# Patient Record
Sex: MALE | Race: WHITE | NOT HISPANIC OR LATINO | Employment: OTHER | URBAN - METROPOLITAN AREA
[De-identification: names, ages, dates, MRNs, and addresses within clinical notes are randomized per-mention and may not be internally consistent; named-entity substitution may affect disease eponyms.]

---

## 2020-08-31 ENCOUNTER — OFFICE VISIT (OUTPATIENT)
Dept: FAMILY MEDICINE | Facility: CLINIC | Age: 55
End: 2020-08-31
Payer: COMMERCIAL

## 2020-08-31 VITALS
SYSTOLIC BLOOD PRESSURE: 130 MMHG | RESPIRATION RATE: 18 BRPM | HEIGHT: 67 IN | DIASTOLIC BLOOD PRESSURE: 76 MMHG | HEART RATE: 77 BPM | BODY MASS INDEX: 31.71 KG/M2 | TEMPERATURE: 97.9 F | OXYGEN SATURATION: 99 % | WEIGHT: 202 LBS

## 2020-08-31 DIAGNOSIS — Z12.5 SCREENING FOR PROSTATE CANCER: ICD-10-CM

## 2020-08-31 DIAGNOSIS — Z00.00 ROUTINE GENERAL MEDICAL EXAMINATION AT A HEALTH CARE FACILITY: Primary | ICD-10-CM

## 2020-08-31 LAB
CHOLEST SERPL-MCNC: 197 MG/DL
GLUCOSE SERPL-MCNC: 106 MG/DL (ref 70–99)
HDLC SERPL-MCNC: 34 MG/DL
LDLC SERPL CALC-MCNC: 93 MG/DL
NONHDLC SERPL-MCNC: 163 MG/DL
TRIGL SERPL-MCNC: 348 MG/DL

## 2020-08-31 PROCEDURE — 99386 PREV VISIT NEW AGE 40-64: CPT | Performed by: FAMILY MEDICINE

## 2020-08-31 PROCEDURE — 82947 ASSAY GLUCOSE BLOOD QUANT: CPT | Performed by: FAMILY MEDICINE

## 2020-08-31 PROCEDURE — 80061 LIPID PANEL: CPT | Performed by: FAMILY MEDICINE

## 2020-08-31 PROCEDURE — 36415 COLL VENOUS BLD VENIPUNCTURE: CPT | Performed by: FAMILY MEDICINE

## 2020-08-31 ASSESSMENT — ENCOUNTER SYMPTOMS
SORE THROAT: 0
DYSURIA: 0
FREQUENCY: 0
DIZZINESS: 0
NERVOUS/ANXIOUS: 0
HEMATOCHEZIA: 0
ARTHRALGIAS: 1
EYE PAIN: 0
WEAKNESS: 0
FEVER: 0
HEMATURIA: 0
JOINT SWELLING: 0
CONSTIPATION: 0
NAUSEA: 0
MYALGIAS: 0
PARESTHESIAS: 0
COUGH: 0
HEARTBURN: 0
PALPITATIONS: 0
ABDOMINAL PAIN: 0
SHORTNESS OF BREATH: 0
CHILLS: 0
HEADACHES: 0
DIARRHEA: 0

## 2020-08-31 ASSESSMENT — MIFFLIN-ST. JEOR: SCORE: 1695.93

## 2020-08-31 NOTE — LETTER
"September 4, 2020      Phillip Schaefer  [address] [address]   [place] [place] [address]        Dear ,    We are writing to inform you of your test results.      Labs show slight increase in blood sugar.      You can lower your chances of progressing to diabetes by keeping your weight down, exercising regularly, and cutting down on empty calories and unhealthy foods.    Please eat more fruits and veggies, lean meats, and whole grains.  Chips, pop, sweets, and high sugar/carbohydrate foods should be rare treats, if at all. (soda pop and juices are especially bad.    Please try to switch to diet pop or diet juices if you drink these regularly)       Cholesterol OK, triglycerides up a bit, that can be improved with  Some diet changes as well. Cholesterol numbers are generally improved by cutting back on greasy/creamy/fatty/fried foods, eating more veggies/salads/fruits, and getting regular exercise. Switching to olive oil as your main cooking and flavoring oil is also a good idea.  (it's considered a \"good\" fat).         We can recheck things next year.  I hope you are doing well.    Resulted Orders   GLUCOSE   Result Value Ref Range    Glucose 106 (H) 70 - 99 mg/dL      Comment:      Non Fasting   Lipid panel reflex to direct LDL Non-fasting   Result Value Ref Range    Cholesterol 197 <200 mg/dL    Triglycerides 348 (H) <150 mg/dL      Comment:      Borderline high:  150-199 mg/dl  High:             200-499 mg/dl  Very high:       >499 mg/dl  Non Fasting      HDL Cholesterol 34 (L) >39 mg/dL    LDL Cholesterol Calculated 93 <100 mg/dL      Comment:      Desirable:       <100 mg/dl    Non HDL Cholesterol 163 (H) <130 mg/dL      Comment:      Above Desirable:  130-159 mg/dl  Borderline high:  160-189 mg/dl  High:             190-219 mg/dl  Very high:       >219 mg/dl         If you have any questions or concerns, please call the clinic at the number listed above.       Sincerely,        Yoseph Coello, " MD

## 2020-08-31 NOTE — PROGRESS NOTES
SUBJECTIVE:   CC: Phillip Schaefer is an 55 year old male who presents for preventative health visit.     Healthy Habits:     Getting at least 3 servings of Calcium per day:  Yes    Bi-annual eye exam:  NO    Dental care twice a year:  NO    Sleep apnea or symptoms of sleep apnea:  None    Diet:  Regular (no restrictions)    Frequency of exercise:  2-3 days/week    Duration of exercise:  15-30 minutes    Taking medications regularly:  Yes    Medication side effects:  None    PHQ-2 Total Score: 0    Additional concerns today:  No            Today's PHQ-2 Score:   PHQ-2 ( 1999 [employer]) 8/31/2020   Q1: Little interest or pleasure in doing things 0   Q2: Feeling down, depressed or hopeless 0   PHQ-2 Score 0   Q1: Little interest or pleasure in doing things Not at all   Q2: Feeling down, depressed or hopeless Not at all   PHQ-2 Score 0       Abuse: Current or Past(Physical, Sexual or Emotional)- No  Do you feel safe in your environment? Yes    Have you ever done Advance Care Planning? (For example, a Health Directive, POLST, or a discussion with a medical provider or your loved ones about your wishes): Yes, advance care planning is on file.    Social History     Tobacco Use     Smoking status: Not on file   Substance Use Topics     Alcohol use: Not on file         Alcohol Use 8/31/2020   Prescreen: >3 drinks/day or >7 drinks/week? No       Last PSA: No results found for: PSA    Reviewed orders with patient. Reviewed health maintenance and updated orders accordingly - Yes      Reviewed and updated as needed this visit by clinical staff         Reviewed and updated as needed this visit by Provider            Review of Systems   Constitutional: Negative for chills and fever.   HENT: Negative for congestion, ear pain, hearing loss and sore throat.    Eyes: Negative for pain and visual disturbance.   Respiratory: Negative for cough and shortness of breath.    Cardiovascular: Negative for chest pain, palpitations and  "peripheral edema.   Gastrointestinal: Negative for abdominal pain, constipation, diarrhea, heartburn, hematochezia and nausea.   Genitourinary: Negative for discharge, dysuria, frequency, genital sores, hematuria, impotence and urgency.   Musculoskeletal: Positive for arthralgias. Negative for joint swelling and myalgias.   Skin: Negative for rash.   Neurological: Negative for dizziness, weakness, headaches and paresthesias.   Psychiatric/Behavioral: Negative for mood changes. The patient is not nervous/anxious.          OBJECTIVE:   /76   Pulse 77   Temp 97.9  F (36.6  C)   Resp 18   Ht 1.695 m (5' 6.75\")   Wt 91.6 kg (202 lb)   SpO2 99%   BMI 31.88 kg/m      Physical Exam  Gen: alert and oriented, in no acute distress, affect within normal limits  Neck: supple with no masses or nodes  Throat: oropharynx clear, no exudate or tonsillar/palate asymmetry.    CV: RRR, no murmur  Lungs: clear bilaterally with good effort  Abd: nontender, no mass  Ext: no edema or lesions   Neuro: moving all extremities, gait normal, no focal deficts noted      ASSESSMENT/PLAN:   Well exam      Discussed risks and benefits of PSA screening with the patient, attempting to engage in shared decision making.  Discussed the high likelihood of further invasive evaluation if the PSA is elevated.  Discussed inability to reliably distinguish aggressive from indolent cancer on biopsy. Explained that PSA screeing and aggressive treatment for biopsy confirmed cancer may extend life in those patients harboring an aggressive form of prostate cancer, but will overtreat the overwhelming majority of men because of the high rate of indolent cancer.  We discussed that treatment of prostate cancer is not benign, and that significant side effects are a real possibility.  We discussed current USPSTF recommendations against any routine screening for prostate cancer.                 Given the above information, the patient elected not to procede " "with PSA screeing at this time.  He understands he can revisit the discussion at any time.       COUNSELING:   Reviewed preventive health counseling, as reflected in patient instructions       Regular exercise       Healthy diet/nutrition    Estimated body mass index is 31.88 kg/m  as calculated from the following:    Height as of this encounter: 1.695 m (5' 6.75\").    Weight as of this encounter: 91.6 kg (202 lb).     Weight management plan: diet/exercise    He has no history on file for tobacco.          Yoseph Coello MD  [facility]  "

## 2023-10-12 ENCOUNTER — OFFICE VISIT (OUTPATIENT)
Dept: FAMILY MEDICINE | Facility: CLINIC | Age: 58
End: 2023-10-12
Payer: COMMERCIAL

## 2023-10-12 ENCOUNTER — ANCILLARY PROCEDURE (OUTPATIENT)
Dept: GENERAL RADIOLOGY | Facility: CLINIC | Age: 58
End: 2023-10-12
Attending: PHYSICIAN ASSISTANT
Payer: COMMERCIAL

## 2023-10-12 ENCOUNTER — LAB (OUTPATIENT)
Dept: FAMILY MEDICINE | Facility: CLINIC | Age: 58
End: 2023-10-12

## 2023-10-12 VITALS
HEART RATE: 78 BPM | HEIGHT: 67 IN | BODY MASS INDEX: 32.11 KG/M2 | OXYGEN SATURATION: 98 % | RESPIRATION RATE: 18 BRPM | WEIGHT: 204.6 LBS | SYSTOLIC BLOOD PRESSURE: 124 MMHG | DIASTOLIC BLOOD PRESSURE: 84 MMHG | TEMPERATURE: 96.9 F

## 2023-10-12 DIAGNOSIS — M25.512 CHRONIC PAIN OF BOTH SHOULDERS: ICD-10-CM

## 2023-10-12 DIAGNOSIS — M25.511 CHRONIC PAIN OF BOTH SHOULDERS: ICD-10-CM

## 2023-10-12 DIAGNOSIS — Z12.11 SPECIAL SCREENING FOR MALIGNANT NEOPLASMS, COLON: ICD-10-CM

## 2023-10-12 DIAGNOSIS — R20.2 NUMBNESS AND TINGLING OF BOTH FEET: ICD-10-CM

## 2023-10-12 DIAGNOSIS — G89.29 CHRONIC PAIN OF BOTH SHOULDERS: ICD-10-CM

## 2023-10-12 DIAGNOSIS — Z13.1 SCREENING FOR DIABETES MELLITUS: ICD-10-CM

## 2023-10-12 DIAGNOSIS — Z11.59 NEED FOR HEPATITIS C SCREENING TEST: ICD-10-CM

## 2023-10-12 DIAGNOSIS — Z13.220 SCREENING FOR HYPERLIPIDEMIA: ICD-10-CM

## 2023-10-12 DIAGNOSIS — Z11.4 ENCOUNTER FOR SCREENING FOR HIV: ICD-10-CM

## 2023-10-12 DIAGNOSIS — R20.0 NUMBNESS AND TINGLING OF BOTH FEET: ICD-10-CM

## 2023-10-12 DIAGNOSIS — Z00.00 ROUTINE GENERAL MEDICAL EXAMINATION AT A HEALTH CARE FACILITY: ICD-10-CM

## 2023-10-12 DIAGNOSIS — M25.50 POLYARTHRALGIA: Primary | ICD-10-CM

## 2023-10-12 LAB
B BURGDOR IGG+IGM SER QL: 0.28
CHOLEST SERPL-MCNC: 199 MG/DL
CRP SERPL-MCNC: <3 MG/L
ERYTHROCYTE [SEDIMENTATION RATE] IN BLOOD BY WESTERGREN METHOD: 5 MM/HR (ref 0–20)
FASTING STATUS PATIENT QL REPORTED: YES
GLUCOSE SERPL-MCNC: 114 MG/DL (ref 70–99)
HCV AB SERPL QL IA: NONREACTIVE
HDLC SERPL-MCNC: 33 MG/DL
HIV 1+2 AB+HIV1 P24 AG SERPL QL IA: NONREACTIVE
LDLC SERPL CALC-MCNC: 147 MG/DL
NONHDLC SERPL-MCNC: 166 MG/DL
TRIGL SERPL-MCNC: 97 MG/DL

## 2023-10-12 PROCEDURE — 73030 X-RAY EXAM OF SHOULDER: CPT | Mod: TC | Performed by: STUDENT IN AN ORGANIZED HEALTH CARE EDUCATION/TRAINING PROGRAM

## 2023-10-12 PROCEDURE — 99396 PREV VISIT EST AGE 40-64: CPT | Performed by: PHYSICIAN ASSISTANT

## 2023-10-12 PROCEDURE — 85652 RBC SED RATE AUTOMATED: CPT | Performed by: PHYSICIAN ASSISTANT

## 2023-10-12 PROCEDURE — 87389 HIV-1 AG W/HIV-1&-2 AB AG IA: CPT | Performed by: PHYSICIAN ASSISTANT

## 2023-10-12 PROCEDURE — 86140 C-REACTIVE PROTEIN: CPT | Performed by: PHYSICIAN ASSISTANT

## 2023-10-12 PROCEDURE — 86803 HEPATITIS C AB TEST: CPT | Performed by: PHYSICIAN ASSISTANT

## 2023-10-12 PROCEDURE — 99214 OFFICE O/P EST MOD 30 MIN: CPT | Mod: 25 | Performed by: PHYSICIAN ASSISTANT

## 2023-10-12 PROCEDURE — 80061 LIPID PANEL: CPT | Performed by: PHYSICIAN ASSISTANT

## 2023-10-12 PROCEDURE — 36415 COLL VENOUS BLD VENIPUNCTURE: CPT | Performed by: PHYSICIAN ASSISTANT

## 2023-10-12 PROCEDURE — 86618 LYME DISEASE ANTIBODY: CPT | Performed by: PHYSICIAN ASSISTANT

## 2023-10-12 PROCEDURE — 82947 ASSAY GLUCOSE BLOOD QUANT: CPT | Performed by: PHYSICIAN ASSISTANT

## 2023-10-12 PROCEDURE — 86038 ANTINUCLEAR ANTIBODIES: CPT | Performed by: PHYSICIAN ASSISTANT

## 2023-10-12 PROCEDURE — 86200 CCP ANTIBODY: CPT | Performed by: PHYSICIAN ASSISTANT

## 2023-10-12 ASSESSMENT — ENCOUNTER SYMPTOMS
SORE THROAT: 0
DIZZINESS: 0
DIARRHEA: 0
CHILLS: 0
DYSURIA: 0
ABDOMINAL PAIN: 0
PALPITATIONS: 0
ARTHRALGIAS: 1
PARESTHESIAS: 0
CONSTIPATION: 0
WEAKNESS: 1
HEMATURIA: 0
JOINT SWELLING: 1
NAUSEA: 0
HEADACHES: 0
EYE PAIN: 0
NERVOUS/ANXIOUS: 0
MYALGIAS: 1
FEVER: 0
FREQUENCY: 0
SHORTNESS OF BREATH: 0
COUGH: 0
HEMATOCHEZIA: 0

## 2023-10-12 ASSESSMENT — PAIN SCALES - GENERAL: PAINLEVEL: MODERATE PAIN (4)

## 2023-10-12 NOTE — PROGRESS NOTES
"SUBJECTIVE:   CC: Phillip is an 58 year old who presents for preventative health visit.       10/12/2023     8:09 AM   Additional Questions   Roomed by Rowena MOSCOSO CMA     Healthy Habits:     Getting at least 3 servings of Calcium per day:  Yes    Bi-annual eye exam:  Yes    Dental care twice a year:  Yes    Sleep apnea or symptoms of sleep apnea:  None    Diet:  Other    Duration of exercise:  N/A    Taking medications regularly:  Yes    Medication side effects:  None    Today's PHQ-2 Score:       10/12/2023     8:02 AM   PHQ-2 ( 1999 [employer])   Q1: Little interest or pleasure in doing things 0   Q2: Feeling down, depressed or hopeless 0   PHQ-2 Score 0   Q1: Little interest or pleasure in doing things Not at all   Q2: Feeling down, depressed or hopeless Not at all   PHQ-2 Score 0       Social History     Tobacco Use    Smoking status: Former     Types: Cigarettes     Quit date: 8/31/2013     Years since quitting: 10.1    Smokeless tobacco: Never   Substance Use Topics    Alcohol use: Yes     Comment: rare, a drink every 3 months             10/12/2023     8:01 AM   Alcohol Use   Prescreen: >3 drinks/day or >7 drinks/week? No          No data to display                Last PSA: No results found for: \"PSA\"    Reviewed orders with patient. Reviewed health maintenance and updated orders accordingly - Yes  Lab work is in process  Labs reviewed in EPIC  BP Readings from Last 3 Encounters:   10/12/23 124/84   08/31/20 130/76    Wt Readings from Last 3 Encounters:   10/12/23 92.8 kg (204 lb 9.6 oz)   08/31/20 91.6 kg (202 lb)         Reviewed and updated as needed this visit by clinical staff   Tobacco  Allergies  Meds  Problems  Med Hx  Surg Hx  Fam Hx        Reviewed and updated as needed this visit by Provider   Tobacco  Allergies  Meds  Problems  Med Hx  Surg Hx  Fam Hx         History reviewed. No pertinent past medical history.   Past Surgical History:   Procedure Laterality Date    CARPAL TUNNEL RELEASE " "RT/LT      HERNIA REPAIR      JOINT REPLACEMENT      hip    LACERATION REPAIR      forearm       Review of Systems   Constitutional:  Negative for chills and fever.   HENT:  Negative for congestion, ear pain, hearing loss and sore throat.    Eyes:  Negative for pain and visual disturbance.   Respiratory:  Negative for cough and shortness of breath.    Cardiovascular:  Negative for chest pain, palpitations and peripheral edema.   Gastrointestinal:  Negative for abdominal pain, constipation, diarrhea, hematochezia and nausea.   Genitourinary:  Negative for dysuria, frequency, genital sores, hematuria, impotence, penile discharge and urgency.   Musculoskeletal:  Positive for arthralgias, joint swelling and myalgias.   Skin:  Negative for rash.   Neurological:  Positive for weakness. Negative for dizziness, headaches and paresthesias.   Psychiatric/Behavioral:  Negative for mood changes. The patient is not nervous/anxious.      OBJECTIVE:   /84 (BP Location: Right arm, Patient Position: Sitting, Cuff Size: Adult Large)   Pulse 78   Temp 96.9  F (36.1  C) (Tympanic)   Resp 18   Ht 1.695 m (5' 6.75\")   Wt 92.8 kg (204 lb 9.6 oz)   SpO2 98%   BMI 32.29 kg/m      Physical Exam  GENERAL: healthy, alert and no distress  NECK: no adenopathy, no asymmetry, masses, or scars and thyroid normal to palpation  RESP: lungs clear to auscultation - no rales, rhonchi or wheezes  CV: regular rate and rhythm, normal S1 S2, no S3 or S4, no murmur, click or rub, no peripheral edema and peripheral pulses strong  ABDOMEN: soft, nontender, no hepatosplenomegaly, no masses and bowel sounds normal  MS: normal muscle tone, decreased passive range of motion bilaterally, and peripheral pulses normal.    Right shoulder: strength 5/5, negative Jarreau test, unable to perform Keo liftoff due to patient limited ROM, pain against resistance with should flexion, negative apprehension test.    Left shoulder: strength 5-/5, positive " Mission test, unable to perform Austin liftoff due to patient limited ROM, pain against resistance with shoulder flexion, negative apprehension test.   SKIN: no suspicious lesions or rashes  EXTREMITIES (right and left feet): Monofilament utilized and sensation intact bilaterally, strength 5/5 bilaterally, pain to deep palpation at medial ball of feet. No lesions or masses evident with palpation.     Diagnostic Test Results:  Labs reviewed in Epic  No results found for this or any previous visit (from the past 24 hour(s)).      ASSESSMENT/PLAN:   Polyarthralgia  Present for past 4 months and is constant, feels weaker. Feels fatigued. Requests Lyme titer due to several tick bites over the summer. Polymyalgia rheumatica vs Lyme vs chronic rotator cuff sprain vs other. Labs ordered for further evaluation.   - Lyme Disease Total Abs Bld with Reflex to Confirm CLIA; Future  - ESR: Erythrocyte sedimentation rate; Future  - CRP, inflammation; Future  - Anti Nuclear Adrienne IgG by IFA with Reflex; Future  - Cyclic Citrullinated Peptide Antibody IgG; Future    Chronic pain of both shoulders  Began in Spring 2023 and snaps from time to time. Limited motion, constant pain and discomfort, limited ROM. Polymyalgia rheumatica vs Lyme vs OA or other msk pathology, work up pending. XR ordered to rule out acute process. Recommend PT to strengthen muscles surrounding rotator cuff muscles. If labs normal, consider referral to orthopedics for further evaluation. Follow-up after PT, about 3 months.   - ESR: Erythrocyte sedimentation rate; Future  - CRP, inflammation; Future  - Anti Nuclear Adrienne IgG by IFA with Reflex; Future  - Cyclic Citrullinated Peptide Antibody IgG; Future  - Physical Therapy Referral; Future  - XR Shoulder Bilateral G/E 2 Views; Future    Numbness and tingling of both feet  Present for 3 years and constant. Hurts when walks barefoot around house but no evidence of plantar fasciitis or plantar neuroma. Will check  glucose to determine if potential diabetic etiology of numbness but unclear at this time.    Routine general medical examination at a health care facility  58 yr old here for physical exam. Last physical exam done 1 year ago. Discussed preventative screenings which are updated below. Counseled on immunizations and healthy lifestyle. Follow-up in 1 year for repeat physical exam.     Screening for hyperlipidemia  Routine lipid panel due to BMI.   - Lipid panel reflex to direct LDL Fasting; Future    Screening for diabetes mellitus  Routine glucose screening due to BMI, numbness in bilateral feet.   - Glucose; Future    Special screening for malignant neoplasms, colon  Patient does not want to pursue colonoscopy at this time and opts for ColoGuard. Understands that if ColoGuard is abnormal that will need further screen with colonoscopy.   - COLOGUARD(EXACT SCIENCES); Future    Encounter for screening for HIV  Routine single HIV screen with low-risk adults.   - HIV Antigen Antibody Combo; Future    Need for hepatitis C screening test  Routine single Hepatitis C screen with low-risk adults.   - Hepatitis C antibody; Future    Patient has been advised of split billing requirements and indicates understanding: Yes    COUNSELING:   Reviewed preventive health counseling, as reflected in patient instructions       Regular exercise       Healthy diet/nutrition       Immunizations  Patient to get Shingrix vaccine at local pharmacy   Declined COVID/Flu/RSV vaccines        Consider Hep C screening for all patients one time for ages 18-79 years       HIV screeninx in teen years, 1x in adult years, and at intervals if high risk       Colorectal cancer screening    He reports that he quit smoking about 10 years ago. His smoking use included cigarettes. He has never used smokeless tobacco.    This patient was seen by KD Camilo-S3, in collaboration with ZAH[facility].     Physician Attestation   IDane  ABDIRASHID Moody, was present with the medical/KARRIE student who participated in the service and in the documentation of the note.  I have verified the history and personally performed the physical exam and medical decision making.  I agree with the assessment and plan of care as documented in the note.      Dane Moody PA-C   [facility]

## 2023-10-12 NOTE — PATIENT INSTRUCTIONS
Preventative labs and labs to determine the cause of you shoulder pain and weakness.     Colon cancer screening will be mailed to your house.     XR of your shoulders today.     Preventive Health Recommendations  Male Ages 50 - 64    Yearly exam:             See your health care provider every year in order to  o   Review health changes.   o   Discuss preventive care.    o   Review your medicines if your doctor has prescribed any.   Have a cholesterol test every 5 years, or more frequently if you are at risk for high cholesterol/heart disease.   Have a diabetes test (fasting glucose) every three years. If you are at risk for diabetes, you should have this test more often.   Have a colonoscopy at age 45, or have a yearly FIT test (stool test). These exams will check for colon cancer.    Talk with your health care provider about whether or not a prostate cancer screening test (PSA) is right for you.  You should be tested each year for STDs (sexually transmitted diseases), if you re at risk.     Shots: Get a flu shot each year. Get a tetanus shot every 10 years.     Nutrition:  Eat at least 5 servings of fruits and vegetables daily.   Eat whole-grain bread, whole-wheat pasta and brown rice instead of white grains and rice.   Get adequate Calcium and Vitamin D.     Lifestyle  Exercise for at least 150 minutes a week (30 minutes a day, 5 days a week). This will help you control your weight and prevent disease.   Limit alcohol to one drink per day.   No smoking.   Wear sunscreen to prevent skin cancer.   See your dentist every six months for an exam and cleaning.   See your eye doctor every 1 to 2 years.    Preventive Health Recommendations  Male Ages 50 - 64    Yearly exam:             See your health care provider every year in order to  o   Review health changes.   o   Discuss preventive care.    o   Review your medicines if your doctor has prescribed any.   Have a cholesterol test every 5 years, or more frequently if  you are at risk for high cholesterol/heart disease.   Have a diabetes test (fasting glucose) every three years. If you are at risk for diabetes, you should have this test more often.   Have a colonoscopy at age 45, or have a yearly FIT test (stool test). These exams will check for colon cancer.    Talk with your health care provider about whether or not a prostate cancer screening test (PSA) is right for you.  You should be tested each year for STDs (sexually transmitted diseases), if you re at risk.     Shots: Get a flu shot each year. Get a tetanus shot every 10 years.     Nutrition:  Eat at least 5 servings of fruits and vegetables daily.   Eat whole-grain bread, whole-wheat pasta and brown rice instead of white grains and rice.   Get adequate Calcium and Vitamin D.     Lifestyle  Exercise for at least 150 minutes a week (30 minutes a day, 5 days a week). This will help you control your weight and prevent disease.   Limit alcohol to one drink per day.   No smoking.   Wear sunscreen to prevent skin cancer.   See your dentist every six months for an exam and cleaning.   See your eye doctor every 1 to 2 years.

## 2023-10-13 LAB
ANA SER QL IF: NEGATIVE
CCP AB SER IA-ACNC: <0.4 U/ML

## 2023-11-02 ENCOUNTER — OFFICE VISIT (OUTPATIENT)
Dept: ORTHOPEDICS | Facility: CLINIC | Age: 58
End: 2023-11-02
Attending: PHYSICIAN ASSISTANT
Payer: COMMERCIAL

## 2023-11-02 VITALS
SYSTOLIC BLOOD PRESSURE: 138 MMHG | HEIGHT: 67 IN | WEIGHT: 207 LBS | BODY MASS INDEX: 32.49 KG/M2 | DIASTOLIC BLOOD PRESSURE: 84 MMHG

## 2023-11-02 DIAGNOSIS — M75.80 ROTATOR CUFF TENDINITIS, UNSPECIFIED LATERALITY: ICD-10-CM

## 2023-11-02 DIAGNOSIS — G89.29 CHRONIC PAIN OF BOTH SHOULDERS: ICD-10-CM

## 2023-11-02 DIAGNOSIS — M25.512 CHRONIC PAIN OF BOTH SHOULDERS: ICD-10-CM

## 2023-11-02 DIAGNOSIS — M25.619 LIMITED RANGE OF MOTION (ROM) OF SHOULDER: ICD-10-CM

## 2023-11-02 DIAGNOSIS — M75.32 CALCIFIC TENDINITIS OF BOTH SHOULDER REGIONS: Primary | ICD-10-CM

## 2023-11-02 DIAGNOSIS — M19.019 OSTEOARTHRITIS OF AC (ACROMIOCLAVICULAR) JOINT: ICD-10-CM

## 2023-11-02 DIAGNOSIS — M75.31 CALCIFIC TENDINITIS OF BOTH SHOULDER REGIONS: Primary | ICD-10-CM

## 2023-11-02 DIAGNOSIS — M25.511 CHRONIC PAIN OF BOTH SHOULDERS: ICD-10-CM

## 2023-11-02 PROCEDURE — 99244 OFF/OP CNSLTJ NEW/EST MOD 40: CPT | Mod: 25 | Performed by: FAMILY MEDICINE

## 2023-11-02 PROCEDURE — 20611 DRAIN/INJ JOINT/BURSA W/US: CPT | Mod: 50 | Performed by: FAMILY MEDICINE

## 2023-11-02 RX ORDER — ROPIVACAINE HYDROCHLORIDE 5 MG/ML
3 INJECTION, SOLUTION EPIDURAL; INFILTRATION; PERINEURAL
Status: SHIPPED | OUTPATIENT
Start: 2023-11-02

## 2023-11-02 RX ORDER — TRIAMCINOLONE ACETONIDE 40 MG/ML
40 INJECTION, SUSPENSION INTRA-ARTICULAR; INTRAMUSCULAR
Status: SHIPPED | OUTPATIENT
Start: 2023-11-02

## 2023-11-02 RX ADMIN — ROPIVACAINE HYDROCHLORIDE 3 ML: 5 INJECTION, SOLUTION EPIDURAL; INFILTRATION; PERINEURAL at 10:20

## 2023-11-02 RX ADMIN — TRIAMCINOLONE ACETONIDE 40 MG: 40 INJECTION, SUSPENSION INTRA-ARTICULAR; INTRAMUSCULAR at 10:20

## 2023-11-02 NOTE — PROGRESS NOTES
"Phillip Schaefer  :  1965  DOS: 2023  MRN: [id number]    Sports Medicine Clinic Visit    PCP: No Ref-Primary, Physician    Phillip Schaefer is a 58 year old Right hand dominant male who is seen in consultation at the request of  Dane Moody PA-C presenting with bilateral shoulder pain.    Injury: Gradual onset of pain over the past 9 months that initially started with shoveling and plowing snow in 2023.  Pain located over bilateral generalized anterior, posterior shoulder with radiating to periscapular region.  Denies any radiating symptoms at this time.  Additional Features:  Positive: weakness and joint stiffness.  Symptoms are better with rest, naproxen.  Symptoms are worse with: shoulder flexion/abduction, lifting, IR behind back, lying on either shoulder, driving.  Other evaluation and/or treatments so far consists of: Aleve and Rest, blood inflammatory labs.  Recent imaging completed: X-rays completed 10/12/23.  Prior History of related problems: none    Social History: currently employed as lawn mowing/snow plow service    Review of Systems  Musculoskeletal: as above  Remainder of review of systems is negative including constitutional, CV, pulmonary, GI, Skin and Neurologic except as noted in HPI or medical history.    No past medical history on file.  Past Surgical History:   Procedure Laterality Date    CARPAL TUNNEL RELEASE RT/LT      HERNIA REPAIR      JOINT REPLACEMENT      hip    LACERATION REPAIR      forearm     Family History   Problem Relation Age of Onset    Macular Degeneration Mother     Hypertension Mother     Pancreatic Cancer Father     Cancer Maternal Grandfather     Diabetes Paternal Grandmother     Cancer Paternal Grandfather     Prostate Cancer Brother        Objective  /84   Ht 1.695 m (5' 6.75\")   Wt 93.9 kg (207 lb)   BMI 32.66 kg/m      General: healthy, alert and in no distress    HEENT: no scleral icterus or conjunctival erythema   Skin: no " suspicious lesions or rash. No jaundice.   CV: regular rhythm by palpation, 2+ distal pulses, no pedal edema    Resp: normal respiratory effort without conversational dyspnea   Psych: normal mood and affect    Gait: nonantalgic, appropriate coordination and balance   Neuro: normal light touch sensory exam of the extremities. Motor strength as noted below     Bilateral Shoulder exam    ROM:        Baseline active and passive ROM with flexion, extension, abduction, internal and external rotation, mild/moderate limitation with IR due to stiffness/pain    Tender:        acromioclavicular joint L>R       subacromial space       posterior shoulder       GH joint mild L>R    Non Tender:       remainder of shoulder    Strength:        abduction 5-/5       internal rotation 5/5       external rotation 5-/5       adduction 5/5    Impingement testing:        positive (+) Neer       positive (+) Anders       positive (+) empty can       Mildly painful left crank       Mildly painful crossover, L>R    Stability testing:       neg (-) anterior glide       neg (-) sulcus sign    Skin:       no visible deformities       well perfused       capillary refill brisk    Sensation:        normal sensation over shoulder and upper extremity       Radiology  Results for orders placed or performed in visit on 10/12/23   XR Shoulder Bilateral G/E 2 Views    Narrative    XR SHOULDER BILATERAL G/E 2 VIEWS 10/12/2023 9:34 AM     HISTORY: Chronic pain of both shoulders; Chronic pain of both  shoulders; Chronic pain of both shoulders    COMPARISON: None.       Impression    IMPRESSION:    Right shoulder: No acute fracture or dislocation. Moderate  acromioclavicular joint osteoarthrosis.    Left shoulder: No acute fracture or dislocation. Moderate  acromioclavicular joint osteoarthrosis. Small amorphous calcification  projecting near the insertion of the rotator cuff tendons, which could  reflect calcific tendinopathy.    STEFAN LOPEZ MD          SYSTEM ID:  [id number]     Large Joint Injection/Arthocentesis: bilateral subacromial bursa    Date/Time: 11/2/2023 10:20 AM    Performed by: Jamal Garcia DO  Authorized by: Jamal Garcia DO    Indications:  Pain  Needle Size:  22 G  Guidance: ultrasound    Approach:  Anterolateral  Location:  Shoulder  Laterality:  Bilateral      Site:  Bilateral subacromial bursa  Medications (Right):  40 mg triamcinolone 40 MG/ML; 3 mL ROPivacaine 5 MG/ML  Medications (Left):  40 mg triamcinolone 40 MG/ML; 3 mL ROPivacaine 5 MG/ML  Outcome:  Tolerated well, no immediate complications  Procedure discussed: discussed risks, benefits, and alternatives    Consent Given by:  Patient  Timeout: timeout called immediately prior to procedure    Prep: patient was prepped and draped in usual sterile fashion     Ultrasound images of procedure were permanently stored.         Assessment:  1. Calcific tendinitis of both shoulder regions    2. Chronic pain of both shoulders    3. Rotator cuff tendinitis, unspecified laterality    4. Limited range of motion (ROM) of shoulder    5. Osteoarthritis of AC (acromioclavicular) joint        Plan:  Discussed the assessment with the patient.  Follow up: prn based on short term progress, plan for 3 weeks if limited or no improvement  Relatively consistent pain since April, pain appears multifactorial with many positive tests in both shoulders  Most significant pain appears to be in the rotator cuff, but also pain in both glenohumeral joints and left greater than right AC joint  Trial of diagnostic and hopefully therapeutic corticosteroid injection to the bilateral subacromial space today  Consider alternative locations in the future based on progress  Physical therapy options and activity modification strategies reviewed  XR images independently visualized and reviewed with patient today in clinic  Consider advanced imaging in the future based on progress, defer for now as  rotator cuff feels relatively strong, although painful  Oral Tylenol and topical Voltaren gel reviewed as safe OTC options, reviewed safe dosing strategies  Expectations and goals of CSI reviewed  Often 2-3 days for steroid effect, and can take up to two weeks for maximum effect  We discussed modified progressive pain-free activity as tolerated  Do not overuse in first two weeks if feeling better due to concern for vulnerability while steroid is working  Supportive care reviewed  All questions were answered today  Contact us with additional questions or concerns  Signs and sx of concern reviewed    Thanks very much for sending this nice gentleman to us, I will keep you updated with his progress      Jamal Garcia DO, CAQ  Sports Medicine Physician  [facility]        Disclaimer: This note consists of symbols derived from keyboarding, dictation and/or voice recognition software. As a result, there may be errors in the script that have gone undetected. Please consider this when interpreting information found in this chart.

## 2023-11-02 NOTE — LETTER
2023         RE: Phillip Schaefer  [address]  [place] [address]        Dear Colleague,    Thank you for referring your patient, Phillip Schaefer, to the [facility] [place]. Please see a copy of my visit note below.    Phillip cShaefer  :  1965  DOS: 2023  MRN: [id number]    Sports Medicine Clinic Visit    PCP: No Ref-Primary, Physician    Phillip Schaefer is a 58 year old Right hand dominant male who is seen in consultation at the request of  Dane Moody PA-C presenting with bilateral shoulder pain.    Injury: Gradual onset of pain over the past 9 months that initially started with shoveling and plowing snow in 2023.  Pain located over bilateral generalized anterior, posterior shoulder with radiating to periscapular region.  Denies any radiating symptoms at this time.  Additional Features:  Positive: weakness and joint stiffness.  Symptoms are better with rest, naproxen.  Symptoms are worse with: shoulder flexion/abduction, lifting, IR behind back, lying on either shoulder, driving.  Other evaluation and/or treatments so far consists of: Aleve and Rest, blood inflammatory labs.  Recent imaging completed: X-rays completed 10/12/23.  Prior History of related problems: none    Social History: currently employed as lawn mowing/snow plow service    Review of Systems  Musculoskeletal: as above  Remainder of review of systems is negative including constitutional, CV, pulmonary, GI, Skin and Neurologic except as noted in HPI or medical history.    No past medical history on file.  Past Surgical History:   Procedure Laterality Date     CARPAL TUNNEL RELEASE RT/LT       HERNIA REPAIR       JOINT REPLACEMENT      hip     LACERATION REPAIR      forearm     Family History   Problem Relation Age of Onset     Macular Degeneration Mother      Hypertension Mother      Pancreatic Cancer Father      Cancer Maternal Grandfather      Diabetes  "Paternal Grandmother      Cancer Paternal Grandfather      Prostate Cancer Brother        Objective  /84   Ht 1.695 m (5' 6.75\")   Wt 93.9 kg (207 lb)   BMI 32.66 kg/m      General: healthy, alert and in no distress    HEENT: no scleral icterus or conjunctival erythema   Skin: no suspicious lesions or rash. No jaundice.   CV: regular rhythm by palpation, 2+ distal pulses, no pedal edema    Resp: normal respiratory effort without conversational dyspnea   Psych: normal mood and affect    Gait: nonantalgic, appropriate coordination and balance   Neuro: normal light touch sensory exam of the extremities. Motor strength as noted below     Bilateral Shoulder exam    ROM:        Baseline active and passive ROM with flexion, extension, abduction, internal and external rotation, mild/moderate limitation with IR due to stiffness/pain    Tender:        acromioclavicular joint L>R       subacromial space       posterior shoulder       GH joint mild L>R    Non Tender:       remainder of shoulder    Strength:        abduction 5-/5       internal rotation 5/5       external rotation 5-/5       adduction 5/5    Impingement testing:        positive (+) Neer       positive (+) Anders       positive (+) empty can       Mildly painful left crank       Mildly painful crossover, L>R    Stability testing:       neg (-) anterior glide       neg (-) sulcus sign    Skin:       no visible deformities       well perfused       capillary refill brisk    Sensation:        normal sensation over shoulder and upper extremity       Radiology  Results for orders placed or performed in visit on 10/12/23   XR Shoulder Bilateral G/E 2 Views    Narrative    XR SHOULDER BILATERAL G/E 2 VIEWS 10/12/2023 9:34 AM     HISTORY: Chronic pain of both shoulders; Chronic pain of both  shoulders; Chronic pain of both shoulders    COMPARISON: None.       Impression    IMPRESSION:    Right shoulder: No acute fracture or dislocation. " Moderate  acromioclavicular joint osteoarthrosis.    Left shoulder: No acute fracture or dislocation. Moderate  acromioclavicular joint osteoarthrosis. Small amorphous calcification  projecting near the insertion of the rotator cuff tendons, which could  reflect calcific tendinopathy.    STEFAN LOPEZ MD         SYSTEM ID:  [id number]     Large Joint Injection/Arthocentesis: bilateral subacromial bursa    Date/Time: 11/2/2023 10:20 AM    Performed by: Jamal Garcia DO  Authorized by: Jamal Garcia DO    Indications:  Pain  Needle Size:  22 G  Guidance: ultrasound    Approach:  Anterolateral  Location:  Shoulder  Laterality:  Bilateral      Site:  Bilateral subacromial bursa  Medications (Right):  40 mg triamcinolone 40 MG/ML; 3 mL ROPivacaine 5 MG/ML  Medications (Left):  40 mg triamcinolone 40 MG/ML; 3 mL ROPivacaine 5 MG/ML  Outcome:  Tolerated well, no immediate complications  Procedure discussed: discussed risks, benefits, and alternatives    Consent Given by:  Patient  Timeout: timeout called immediately prior to procedure    Prep: patient was prepped and draped in usual sterile fashion     Ultrasound images of procedure were permanently stored.         Assessment:  1. Calcific tendinitis of both shoulder regions    2. Chronic pain of both shoulders    3. Rotator cuff tendinitis, unspecified laterality    4. Limited range of motion (ROM) of shoulder    5. Osteoarthritis of AC (acromioclavicular) joint        Plan:  Discussed the assessment with the patient.  Follow up: prn based on short term progress, plan for 3 weeks if limited or no improvement  Relatively consistent pain since April, pain appears multifactorial with many positive tests in both shoulders  Most significant pain appears to be in the rotator cuff, but also pain in both glenohumeral joints and left greater than right AC joint  Trial of diagnostic and hopefully therapeutic corticosteroid injection to the bilateral  subacromial space today  Consider alternative locations in the future based on progress  Physical therapy options and activity modification strategies reviewed  XR images independently visualized and reviewed with patient today in clinic  Consider advanced imaging in the future based on progress, defer for now as rotator cuff feels relatively strong, although painful  Oral Tylenol and topical Voltaren gel reviewed as safe OTC options, reviewed safe dosing strategies  Expectations and goals of CSI reviewed  Often 2-3 days for steroid effect, and can take up to two weeks for maximum effect  We discussed modified progressive pain-free activity as tolerated  Do not overuse in first two weeks if feeling better due to concern for vulnerability while steroid is working  Supportive care reviewed  All questions were answered today  Contact us with additional questions or concerns  Signs and sx of concern reviewed    Thanks very much for sending this nice gentleman to us, I will keep you updated with his progress      Jamal Garcia DO, KELSEY  Sports Medicine Physician  [facility]        Disclaimer: This note consists of symbols derived from keyboarding, dictation and/or voice recognition software. As a result, there may be errors in the script that have gone undetected. Please consider this when interpreting information found in this chart.      Again, thank you for allowing me to participate in the care of your patient.        Sincerely,        Jamal Garcia DO

## 2024-09-09 ENCOUNTER — TELEPHONE (OUTPATIENT)
Dept: FAMILY MEDICINE | Facility: CLINIC | Age: 59
End: 2024-09-09
Payer: COMMERCIAL

## 2024-09-09 NOTE — TELEPHONE ENCOUNTER
Left message for patient to call care team.     Asking for patient to complete and return Cologuard Kit that was delivered in 2023 and order will  10/12/2024

## 2024-12-21 ENCOUNTER — HEALTH MAINTENANCE LETTER (OUTPATIENT)
Age: 59
End: 2024-12-21